# Patient Record
Sex: MALE | Race: WHITE | Employment: OTHER | URBAN - METROPOLITAN AREA
[De-identification: names, ages, dates, MRNs, and addresses within clinical notes are randomized per-mention and may not be internally consistent; named-entity substitution may affect disease eponyms.]

---

## 2017-01-27 ENCOUNTER — APPOINTMENT (OUTPATIENT)
Dept: GENERAL RADIOLOGY | Facility: CLINIC | Age: 32
End: 2017-01-27
Attending: EMERGENCY MEDICINE
Payer: COMMERCIAL

## 2017-01-27 ENCOUNTER — HOSPITAL ENCOUNTER (EMERGENCY)
Facility: CLINIC | Age: 32
Discharge: HOME | End: 2017-01-27
Attending: EMERGENCY MEDICINE | Admitting: EMERGENCY MEDICINE
Payer: COMMERCIAL

## 2017-01-27 VITALS
RESPIRATION RATE: 16 BRPM | HEART RATE: 55 BPM | DIASTOLIC BLOOD PRESSURE: 107 MMHG | HEIGHT: 69 IN | WEIGHT: 185 LBS | BODY MASS INDEX: 27.4 KG/M2 | OXYGEN SATURATION: 97 % | TEMPERATURE: 98.1 F | SYSTOLIC BLOOD PRESSURE: 154 MMHG

## 2017-01-27 DIAGNOSIS — K29.70 GASTRITIS WITHOUT BLEEDING, UNSPECIFIED CHRONICITY, UNSPECIFIED GASTRITIS TYPE: ICD-10-CM

## 2017-01-27 LAB
ALBUMIN SERPL-MCNC: 4.5 G/DL (ref 3.4–5)
ALBUMIN UR-MCNC: NEGATIVE MG/DL
ALP SERPL-CCNC: 73 U/L (ref 40–150)
ALT SERPL W P-5'-P-CCNC: 47 U/L (ref 0–70)
ANION GAP SERPL CALCULATED.3IONS-SCNC: 8 MMOL/L (ref 3–14)
APPEARANCE UR: CLEAR
AST SERPL W P-5'-P-CCNC: 22 U/L (ref 0–45)
BASOPHILS # BLD AUTO: 0 10E9/L (ref 0–0.2)
BASOPHILS NFR BLD AUTO: 0.5 %
BILIRUB DIRECT SERPL-MCNC: 0.1 MG/DL (ref 0–0.2)
BILIRUB SERPL-MCNC: 0.7 MG/DL (ref 0.2–1.3)
BILIRUB UR QL STRIP: NEGATIVE
BUN SERPL-MCNC: 12 MG/DL (ref 7–30)
CALCIUM SERPL-MCNC: 9.1 MG/DL (ref 8.5–10.1)
CHLORIDE SERPL-SCNC: 106 MMOL/L (ref 94–109)
CO2 SERPL-SCNC: 26 MMOL/L (ref 20–32)
COLOR UR AUTO: YELLOW
CREAT SERPL-MCNC: 0.95 MG/DL (ref 0.66–1.25)
D DIMER PPP FEU-MCNC: NORMAL UG/ML FEU (ref 0–0.5)
DIFFERENTIAL METHOD BLD: ABNORMAL
EOSINOPHIL # BLD AUTO: 0.1 10E9/L (ref 0–0.7)
EOSINOPHIL NFR BLD AUTO: 1.9 %
ERYTHROCYTE [DISTWIDTH] IN BLOOD BY AUTOMATED COUNT: 11.9 % (ref 10–15)
GFR SERPL CREATININE-BSD FRML MDRD: NORMAL ML/MIN/1.7M2
GLUCOSE SERPL-MCNC: 87 MG/DL (ref 70–99)
GLUCOSE UR STRIP-MCNC: NEGATIVE MG/DL
HCT VFR BLD AUTO: 42.8 % (ref 40–53)
HGB BLD-MCNC: 15.2 G/DL (ref 13.3–17.7)
HGB UR QL STRIP: NEGATIVE
IMM GRANULOCYTES # BLD: 0 10E9/L (ref 0–0.4)
IMM GRANULOCYTES NFR BLD: 0 %
INTERPRETATION ECG - MUSE: NORMAL
KETONES UR STRIP-MCNC: NEGATIVE MG/DL
LEUKOCYTE ESTERASE UR QL STRIP: NEGATIVE
LIPASE SERPL-CCNC: 120 U/L (ref 73–393)
LYMPHOCYTES # BLD AUTO: 2.2 10E9/L (ref 0.8–5.3)
LYMPHOCYTES NFR BLD AUTO: 37.6 %
MCH RBC QN AUTO: 34.6 PG (ref 26.5–33)
MCHC RBC AUTO-ENTMCNC: 35.5 G/DL (ref 31.5–36.5)
MCV RBC AUTO: 98 FL (ref 78–100)
MONOCYTES # BLD AUTO: 0.4 10E9/L (ref 0–1.3)
MONOCYTES NFR BLD AUTO: 7.2 %
NEUTROPHILS # BLD AUTO: 3.1 10E9/L (ref 1.6–8.3)
NEUTROPHILS NFR BLD AUTO: 52.8 %
NITRATE UR QL: NEGATIVE
NRBC # BLD AUTO: 0 10*3/UL
NRBC BLD AUTO-RTO: 0 /100
PH UR STRIP: 5.5 PH (ref 5–7)
PLATELET # BLD AUTO: 375 10E9/L (ref 150–450)
POTASSIUM SERPL-SCNC: 3.8 MMOL/L (ref 3.4–5.3)
PROT SERPL-MCNC: 7.9 G/DL (ref 6.8–8.8)
RBC # BLD AUTO: 4.39 10E12/L (ref 4.4–5.9)
RBC #/AREA URNS AUTO: 1 /HPF (ref 0–2)
SODIUM SERPL-SCNC: 140 MMOL/L (ref 133–144)
SP GR UR STRIP: 1.02 (ref 1–1.03)
URN SPEC COLLECT METH UR: NORMAL
UROBILINOGEN UR STRIP-MCNC: NORMAL MG/DL (ref 0–2)
WBC # BLD AUTO: 5.8 10E9/L (ref 4–11)
WBC #/AREA URNS AUTO: 1 /HPF (ref 0–2)

## 2017-01-27 PROCEDURE — 85025 COMPLETE CBC W/AUTO DIFF WBC: CPT | Performed by: EMERGENCY MEDICINE

## 2017-01-27 PROCEDURE — 85379 FIBRIN DEGRADATION QUANT: CPT | Performed by: EMERGENCY MEDICINE

## 2017-01-27 PROCEDURE — 25000125 ZZHC RX 250: Performed by: EMERGENCY MEDICINE

## 2017-01-27 PROCEDURE — 25000132 ZZH RX MED GY IP 250 OP 250 PS 637: Performed by: EMERGENCY MEDICINE

## 2017-01-27 PROCEDURE — 80048 BASIC METABOLIC PNL TOTAL CA: CPT | Performed by: EMERGENCY MEDICINE

## 2017-01-27 PROCEDURE — 71020 XR CHEST 2 VW: CPT

## 2017-01-27 PROCEDURE — 93005 ELECTROCARDIOGRAM TRACING: CPT

## 2017-01-27 PROCEDURE — 83690 ASSAY OF LIPASE: CPT | Performed by: EMERGENCY MEDICINE

## 2017-01-27 PROCEDURE — 80076 HEPATIC FUNCTION PANEL: CPT | Performed by: EMERGENCY MEDICINE

## 2017-01-27 PROCEDURE — 81001 URINALYSIS AUTO W/SCOPE: CPT | Performed by: EMERGENCY MEDICINE

## 2017-01-27 PROCEDURE — 99285 EMERGENCY DEPT VISIT HI MDM: CPT | Mod: 25

## 2017-01-27 RX ORDER — OMEPRAZOLE 40 MG/1
40 CAPSULE, DELAYED RELEASE ORAL DAILY
Qty: 30 CAPSULE | Refills: 1 | Status: SHIPPED | OUTPATIENT
Start: 2017-01-27

## 2017-01-27 RX ORDER — MINOCYCLINE HYDROCHLORIDE 50 MG/1
CAPSULE ORAL
COMMUNITY

## 2017-01-27 RX ORDER — ACYCLOVIR 400 MG/1
400 TABLET ORAL
COMMUNITY
Start: 2017-01-23 | End: 2017-01-30

## 2017-01-27 RX ORDER — DEXTROAMPHETAMINE SACCHARATE, AMPHETAMINE ASPARTATE, DEXTROAMPHETAMINE SULFATE AND AMPHETAMINE SULFATE 1.25; 1.25; 1.25; 1.25 MG/1; MG/1; MG/1; MG/1
5 TABLET ORAL
COMMUNITY

## 2017-01-27 RX ORDER — DEXTROAMPHETAMINE SACCHARATE, AMPHETAMINE ASPARTATE MONOHYDRATE, DEXTROAMPHETAMINE SULFATE AND AMPHETAMINE SULFATE 6.25; 6.25; 6.25; 6.25 MG/1; MG/1; MG/1; MG/1
25 CAPSULE, EXTENDED RELEASE ORAL
COMMUNITY

## 2017-01-27 RX ADMIN — LIDOCAINE HYDROCHLORIDE 30 ML: 20 SOLUTION ORAL; TOPICAL at 15:28

## 2017-01-27 ASSESSMENT — ENCOUNTER SYMPTOMS
ABDOMINAL DISTENTION: 1
CONSTIPATION: 0
ABDOMINAL PAIN: 1
SHORTNESS OF BREATH: 0

## 2017-01-27 NOTE — ED AVS SNAPSHOT
Emergency Department    [address] [place] [address]    Phone:  [phone]    Fax:  [phone]                                       Isiah Wong   MRN: [id number]    Department:   Emergency Department   Date of Visit:  1/27/2017           After Visit Summary Signature Page     I have received my discharge instructions, and my questions have been answered. I have discussed any challenges I see with this plan with the nurse or doctor.    ..........................................................................................................................................  Patient/Patient Representative Signature      ..........................................................................................................................................  Patient Representative Print Name and Relationship to Patient    ..................................................               ................................................  Date                                            Time    ..........................................................................................................................................  Reviewed by Signature/Title    ...................................................              ..............................................  Date                                                            Time

## 2017-01-27 NOTE — ED AVS SNAPSHOT
Emergency Department    [address] [facility] [address]    Phone:  [phone]    Fax:  [phone]                                       Isiah Wong   MRN: [id number]    Department:   Emergency Department   Date of Visit:  1/27/2017           Patient Information     Date Of Birth          1985        Your diagnoses for this visit were:     Gastritis without bleeding, unspecified chronicity, unspecified gastritis type        You were seen by John Williamson MD.      Follow-up Information     Follow up with Robert Larios MD.    Specialty:  Internal Medicine    Contact information:    [facility]  [address] [facility] [address]  [phone]          Discharge Instructions         Gastritis (Adult)    Gastritis is inflammation and irritation of the stomach lining. It can be present for a short time (acute) or be long lasting (chronic). Gastritis is often caused by infection with bacteria called H pylori. More than a third of people in the  have this bacteria in their bodies. In many cases, H pylori causes no problems or symptoms. In some people, though, the infection irritates the stomach lining and causes gastritis. Other causes of stomach irritation include drinking alcohol or taking pain-relieving medicines called NSAIDs (such as aspirin or ibuprofen).   Symptoms of gastritis can include:    Abdominal pain or bloating    Loss of appetite    Nausea or vomiting    Vomiting blood or having black stools    Feeling more tired than usual  An inflamed and irritated stomach lining is more likely to develop a sore called an ulcer. To help prevent this, gastritis should be treated.  Home care  If needed, medicines may be prescribed. If you have H pylori infection, treating it will likely relieve your symptoms. Other changes can help reduce stomach irritation and help it heal.    If you have been prescribed medicines for H pylori infection, take them as directed. Take all of  the medicine until it is finished or your healthcare provider tells you to stop, even if you feel better.    Your healthcare provider may recommend avoiding NSAIDs. If you take daily aspirin for your heart or other medical reasons, do not stop without talking to your healthcare provider first.    Avoid drinking alcohol.    Stop smoking. Smoking can irritate the stomach and delay healing. As much as possible, stay away from second hand smoke.  Follow-up care  Follow up with your healthcare provider, or as advised by our staff. Testing may be needed to check for inflammation or an ulcer.  When to seek medical advice  Call your healthcare provider for any of the following:    Stomach pain that gets worse or moves to the lower right abdomen (appendix area)    Chest pain that appears or gets worse, or spreads to the back, neck, shoulder, or arm    Frequent vomiting (can t keep down liquids)    Blood in the stool or vomit (red or black in color)    Feeling weak or dizzy    Fever of 100.4 F (38 C) or higher, or as directed by your healthcare provider    5251-0385 [employer]. [address] [place] [address]. All rights reserved. This information is not intended as a substitute for professional medical care. Always follow your healthcare professional's instructions.          24 Hour Appointment Hotline       To make an appointment at any [facility] clinic, call [phone] ([phone]). If you don't have a family doctor or clinic, we will help you find one. [facility] clinics are conveniently located to serve the needs of you and your family.             Review of your medicines      START taking        Dose / Directions Last dose taken    omeprazole 40 MG capsule   Commonly known as:  priLOSEC   Dose:  40 mg   Quantity:  30 capsule        Take 1 capsule (40 mg) by mouth daily Take 30-60 minutes before a meal.   Refills:  1          Our records show that you are taking the medicines listed below.  If these are incorrect, please call your family doctor or clinic.        Dose / Directions Last dose taken    acyclovir 400 MG tablet   Commonly known as:  ZOVIRAX   Dose:  400 mg        Take 400 mg by mouth   Refills:  0        * amphetamine-dextroamphetamine 25 MG 24 hr capsule   Commonly known as:  ADDERALL XR   Dose:  25 mg        Take 25 mg by mouth   Refills:  0        * amphetamine-dextroamphetamine 5 MG per tablet   Commonly known as:  ADDERALL   Dose:  5 mg        Take 5 mg by mouth   Refills:  0        FISH OIL        Refills:  0        minocycline 50 MG capsule   Commonly known as:  MINOCIN/DYNACIN        Refills:  0        psyllium 58.6 % Powd   Commonly known as:  METAMUCIL        Take by mouth daily   Refills:  0        * Notice:  This list has 2 medication(s) that are the same as other medications prescribed for you. Read the directions carefully, and ask your doctor or other care provider to review them with you.            Prescriptions were sent or printed at these locations (1 Prescription)                   Other Prescriptions                Printed at Department/Unit printer (1 of 1)         omeprazole (PRILOSEC) 40 MG capsule                Procedures and tests performed during your visit     Basic metabolic panel    CBC with platelets differential    D dimer quantitative    EKG 12 lead    Hepatic panel    Lipase    UA with Microscopic    XR Chest 2 Views      Orders Needing Specimen Collection     None      Pending Results     No orders found from 1/26/2017 to 1/28/2017.            Pending Culture Results     No orders found from 1/26/2017 to 1/28/2017.       Test Results from your hospital stay           1/27/2017  2:40 PM - Interface, [place] Results      Component Results     Component Value Ref Range & Units Status    WBC 5.8 4.0 - 11.0 10e9/L Final    RBC Count 4.39 (L) 4.4 - 5.9 10e12/L Final    Hemoglobin 15.2 13.3 - 17.7 g/dL Final    Hematocrit 42.8 40.0 - 53.0 % Final    MCV 98 78 -  100 fl Final    MCH 34.6 (H) 26.5 - 33.0 pg Final    MCHC 35.5 31.5 - 36.5 g/dL Final    RDW 11.9 10.0 - 15.0 % Final    Platelet Count 375 150 - 450 10e9/L Final    Diff Method Automated Method  Final    % Neutrophils 52.8 % Final    % Lymphocytes 37.6 % Final    % Monocytes 7.2 % Final    % Eosinophils 1.9 % Final    % Basophils 0.5 % Final    % Immature Granulocytes 0.0 % Final    Nucleated RBCs 0 0 /100 Final    Absolute Neutrophil 3.1 1.6 - 8.3 10e9/L Final    Absolute Lymphocytes 2.2 0.8 - 5.3 10e9/L Final    Absolute Monocytes 0.4 0.0 - 1.3 10e9/L Final    Absolute Eosinophils 0.1 0.0 - 0.7 10e9/L Final    Absolute Basophils 0.0 0.0 - 0.2 10e9/L Final    Abs Immature Granulocytes 0.0 0 - 0.4 10e9/L Final    Absolute Nucleated RBC 0.0  Final         1/27/2017  2:52 PM - Interface, Flexilab Results      Component Results     Component Value Ref Range & Units Status    Sodium 140 133 - 144 mmol/L Final    Potassium 3.8 3.4 - 5.3 mmol/L Final    Chloride 106 94 - 109 mmol/L Final    Carbon Dioxide 26 20 - 32 mmol/L Final    Anion Gap 8 3 - 14 mmol/L Final    Glucose 87 70 - 99 mg/dL Final    Urea Nitrogen 12 7 - 30 mg/dL Final    Creatinine 0.95 0.66 - 1.25 mg/dL Final    GFR Estimate >90  Non  GFR Calc   >60 mL/min/1.7m2 Final    GFR Estimate If Black >90   GFR Calc   >60 mL/min/1.7m2 Final    Calcium 9.1 8.5 - 10.1 mg/dL Final         1/27/2017  2:52 PM - Interface, Flexilab Results      Component Results     Component Value Ref Range & Units Status    Lipase 120 73 - 393 U/L Final         1/27/2017  3:24 PM - Interface, Flexilab Results      Component Results     Component Value Ref Range & Units Status    D Dimer  0.0 - 0.50 ug/ml FEU Final    <0.3  This D-dimer assay is intended for use in conjuntion with a clinical pretest   probability assessment model to exclude pulmonary embolism (PE) and as an aid   in the diagnosis of deep venous thrombosis (DVT) in outpatients suspected  of PE   or DVT. The cut-off value is 0.5 g/mL FEU.           1/27/2017  3:07 PM - Interface, Flexilab Results      Component Results     Component Value Ref Range & Units Status    Color Urine Yellow  Final    Appearance Urine Clear  Final    Glucose Urine Negative NEG mg/dL Final    Bilirubin Urine Negative NEG Final    Ketones Urine Negative NEG mg/dL Final    Specific Gravity Urine 1.020 1.003 - 1.035 Final    Blood Urine Negative NEG Final    pH Urine 5.5 5.0 - 7.0 pH Final    Protein Albumin Urine Negative NEG mg/dL Final    Urobilinogen mg/dL Normal 0.0 - 2.0 mg/dL Final    Nitrite Urine Negative NEG Final    Leukocyte Esterase Urine Negative NEG Final    Source Midstream Urine  Final    WBC Urine 1 0 - 2 /HPF Final    RBC Urine 1 0 - 2 /HPF Final         1/27/2017  3:13 PM - Interface, Flexilab Results      Component Results     Component Value Ref Range & Units Status    Bilirubin Direct 0.1 0.0 - 0.2 mg/dL Final    Bilirubin Total 0.7 0.2 - 1.3 mg/dL Final    Albumin 4.5 3.4 - 5.0 g/dL Final    Protein Total 7.9 6.8 - 8.8 g/dL Final    Alkaline Phosphatase 73 40 - 150 U/L Final    ALT 47 0 - 70 U/L Final    AST 22 0 - 45 U/L Final         1/27/2017  3:45 PM - Interface, Radiant Ib      Narrative     XR CHEST 2 VW 1/27/2017 3:43 PM     HISTORY: right sided chest pain/ruq pain        Impression     IMPRESSION: Negative exam.    SCOTT SOTOMAYOR MD                Clinical Quality Measure: Blood Pressure Screening     Your blood pressure was checked while you were in the emergency department today. The last reading we obtained was  BP: (!) 154/107 mmHg . Please read the guidelines below about what these numbers mean and what you should do about them.  If your systolic blood pressure (the top number) is less than 120 and your diastolic blood pressure (the bottom number) is less than 80, then your blood pressure is normal. There is nothing more that you need to do about it.  If your systolic blood pressure (the top  "number) is 120-139 or your diastolic blood pressure (the bottom number) is 80-89, your blood pressure may be higher than it should be. You should have your blood pressure rechecked within a year by a primary care provider.  If your systolic blood pressure (the top number) is 140 or greater or your diastolic blood pressure (the bottom number) is 90 or greater, you may have high blood pressure. High blood pressure is treatable, but if left untreated over time it can put you at risk for heart attack, stroke, or kidney failure. You should have your blood pressure rechecked by a primary care provider within the next 4 weeks.  If your provider in the emergency department today gave you specific instructions to follow-up with your doctor or provider even sooner than that, you should follow that instruction and not wait for up to 4 weeks for your follow-up visit.        Thank you for choosing [facility]       Thank you for choosing [facility] for your care. Our goal is always to provide you with excellent care. Hearing back from our patients is one way we can continue to improve our services. Please take a few minutes to complete the written survey that you may receive in the mail after you visit with us. Thank you!        [employer] Information     [employer] lets you send messages to your doctor, view your test results, renew your prescriptions, schedule appointments and more. To sign up, go to [email or web address] . Click on \"Log in\" on the left side of the screen, which will take you to the Welcome page. Then click on \"Sign up Now\" on the right side of the page.     You will be asked to enter the access code listed below, as well as some personal information. Please follow the directions to create your username and password.     Your access code is: [email or web address]  Expires: 2017  3:05 PM     Your access code will  in 90 days. If you need help or a new code, please call your [facility] clinic or [phone].        Care " EveryWhere ID     This is your Care EveryWhere ID. This could be used by other organizations to access your [facility] medical records  [id number]        After Visit Summary       This is your record. Keep this with you and show to your community pharmacist(s) and doctor(s) at your next visit.

## 2017-01-27 NOTE — DISCHARGE INSTRUCTIONS
Gastritis (Adult)    Gastritis is inflammation and irritation of the stomach lining. It can be present for a short time (acute) or be long lasting (chronic). Gastritis is often caused by infection with bacteria called H pylori. More than a third of people in the [place] have this bacteria in their bodies. In many cases, H pylori causes no problems or symptoms. In some people, though, the infection irritates the stomach lining and causes gastritis. Other causes of stomach irritation include drinking alcohol or taking pain-relieving medicines called NSAIDs (such as aspirin or ibuprofen).   Symptoms of gastritis can include:    Abdominal pain or bloating    Loss of appetite    Nausea or vomiting    Vomiting blood or having black stools    Feeling more tired than usual  An inflamed and irritated stomach lining is more likely to develop a sore called an ulcer. To help prevent this, gastritis should be treated.  Home care  If needed, medicines may be prescribed. If you have H pylori infection, treating it will likely relieve your symptoms. Other changes can help reduce stomach irritation and help it heal.    If you have been prescribed medicines for H pylori infection, take them as directed. Take all of the medicine until it is finished or your healthcare provider tells you to stop, even if you feel better.    Your healthcare provider may recommend avoiding NSAIDs. If you take daily aspirin for your heart or other medical reasons, do not stop without talking to your healthcare provider first.    Avoid drinking alcohol.    Stop smoking. Smoking can irritate the stomach and delay healing. As much as possible, stay away from second hand smoke.  Follow-up care  Follow up with your healthcare provider, or as advised by our staff. Testing may be needed to check for inflammation or an ulcer.  When to seek medical advice  Call your healthcare provider for any of the following:    Stomach pain that gets worse or moves to the lower  right abdomen (appendix area)    Chest pain that appears or gets worse, or spreads to the back, neck, shoulder, or arm    Frequent vomiting (can t keep down liquids)    Blood in the stool or vomit (red or black in color)    Feeling weak or dizzy    Fever of 100.4 F (38 C) or higher, or as directed by your healthcare provider    5053-0256 [employer]. [address] [place] [address]. All rights reserved. This information is not intended as a substitute for professional medical care. Always follow your healthcare professional's instructions.

## 2017-01-27 NOTE — ED PROVIDER NOTES
"  History     Chief Complaint:  Abdominal pain    HPI   Isiah Wong is a 31 year old male with a history of pancreatitis who presents with abdominal pain. Around 1000 this morning, the patient was working as a referee and began experiencing intermittent right upper quadrant pain that radiated into his back. Since that time, the pain has become more severe. The pain is exacerbated with deep breathing and twisting. He is also experiencing abdominal bloating. He has not taken any medications for the pain. The patient had a banana and orange juice this morning. He has had normal bowel movements. No trauma. No shortness of breath. No heavy lifting. No fatty or greasy foods or alcohol last night. He does not take NSAIDs on a regular basis. He has never had surgery on his abdomen. He states that these symptoms feel different than his episodes of pancreatitis and he has never had these symptoms in the past.    Cardiac Risk Factors   Sex: male   Tobacco: former smoker  Hypertension: Negative  Diabetes: Negative  Hyperlipidemia: Negative  Family History: Negative    Allergies:  No known drug allergies.     Medications:    Minocycline  Fish oil     Past Medical History:    Pancreatitis    Past Surgical History:    History reviewed. No pertinent past surgical history.    Family History:    History reviewed. No pertinent family history    Social History:  Marital Status: single  Presents to the ED with a friend  Tobacco Use: former smoker  Alcohol Use: negative  PCP: None     Review of Systems   Respiratory: Negative for shortness of breath.    Gastrointestinal: Positive for abdominal pain and abdominal distention. Negative for constipation.   All other systems reviewed and are negative.    Physical Exam   First Vitals:  BP: (!) 156/99 mmHg  Heart Rate: 72  Temp: 98.1  F (36.7  C)  Height: 175.3 cm (5' 9\")  Weight: 83.915 kg (185 lb)  SpO2: 100 %    Physical Exam  GENERAL: well developed, pleasant  HEAD: atraumatic  EYES: " pupils reactive, extraocular muscles intact, conjunctivae normal  ENT:  mucus membranes moist  NECK:  trachea midline, normal range of motion  RESPIRATORY: no tachypnea, breath sounds clear to auscultation   CVS: normal S1/S2, no murmurs, intact distal pulses  ABDOMEN: soft, mild right upper quadrant pain, nondistention  MUSCULOSKELETAL: no deformities  SKIN: warm and dry, no acute rashes or ulceration  NEURO: GCS 15, cranial nerves intact, alert and oriented x3  PSYCH:  Mood/affect normal    Emergency Department Course   ECG:  @ 1504  Indication: abdominal pain  Vent. Rate 67 bpm. [place] interval 142 ms. QRS duration 90 ms. QT/QTc 372/393 ms. P-R-T axis -7 55 23.   Normal sinus rhythm. Normal ECG.    Read @ 1514 by Dr. Williamson.    Imaging:  Radiographic findings were communicated with the patient who voiced understanding of the findings.    XR Chest 2 Views  Negative exam.   Report per radiology.    Laboratory:  CBC:  WBC 5.8, HGB 15.2,   BMP: WNL (Creatinine 0.95)  Lipase: 120  Hepatic panel: Bili Direct 0.1, Bili Total 0.7, Albumin 4.5, Protein Total 7.9, Alkphos 73, ALT 47, AST 22.  D dimer: <0.3    UA: Clear yellow urine WNL     Interventions:  (1528) Xylocaine 2% 15mL, Mylanta 15mL PO    Emergency Department Course:  Nursing notes and vitals reviewed.  I performed an exam of the patient as documented above.  EKG was done, interpretation as above.  Blood was drawn from the patient. This was sent for laboratory testing, findings above.   Urine sample was obtained and sent for laboratory analysis, findings above.  The patient was sent for a chest x-ray while in the emergency department, findings above.   Findings and plan explained to the patient. Patient discharged home with instructions regarding supportive care, medications, and reasons to return. The importance of close follow-up was reviewed. The patient was prescribed omeprazole.    Impression & Plan    Medical Decision Making:  Patient presents with right  upper quadrant pain. Denies it being in correlation with food and does not give a history of any fatty or greasy food ingestion. I see he has had ultrasound in the past that were normal. I see that he was [facility] through [facility] within the last week and he notes that this feels similar. Describes right sided upper quadrant to central pain going into his back. EKG is unrevealing. D dimer, chest x-ray, LFTs, are all normal. Patient notes that he has been under increased stress with some issues with his father, relationship challenges, and friend challenges, so has been under more stress. Patient was given a GI cocktail with significant improvement in his symptoms. Did not suspect pericarditis, acute coronary syndrome, pulmonary embolism, biliary colic, pancreatitis. Patient is not having any lightheadedness symptoms or post concussive symptoms today, but notes that he has had those intermittently and was referred to the head injury clinic, which I strongly encouraged him to do and also encouraged him to establish primary care. Just noted prior to discharge that his blood pressure was slightly elevated and encouraged him to check his blood pressure when he is not in the hospital and also to establish primary care.    Diagnosis:    ICD-10-CM    1. Gastritis without bleeding, unspecified chronicity, unspecified gastritis type K29.70      Disposition:  Discharge to home.    Discharge Medications:  New Prescriptions    OMEPRAZOLE (PRILOSEC) 40 MG CAPSULE    Take 1 capsule (40 mg) by mouth daily Take 30-60 minutes before a meal.     I, Pritesh Sol, am serving as a scribe on 1/27/2017 at 2:24 PM to personally document services performed by Dr. Williamson based on my observations and the provider's statements to me.       John Williamson MD  01/30/17 0148

## 2021-05-29 ENCOUNTER — RECORDS - HEALTHEAST (OUTPATIENT)
Dept: ADMINISTRATIVE | Facility: CLINIC | Age: 36
End: 2021-05-29

## 2021-09-11 ENCOUNTER — OFFICE VISIT (OUTPATIENT)
Dept: URGENT CARE | Facility: URGENT CARE | Age: 36
End: 2021-09-11
Payer: COMMERCIAL

## 2021-09-11 VITALS
SYSTOLIC BLOOD PRESSURE: 147 MMHG | BODY MASS INDEX: 28.06 KG/M2 | WEIGHT: 190 LBS | DIASTOLIC BLOOD PRESSURE: 101 MMHG | TEMPERATURE: 97.2 F | OXYGEN SATURATION: 99 % | HEART RATE: 74 BPM

## 2021-09-11 DIAGNOSIS — R36.9 PENILE DISCHARGE: Primary | ICD-10-CM

## 2021-09-11 DIAGNOSIS — R30.0 DYSURIA: ICD-10-CM

## 2021-09-11 LAB
ALBUMIN UR-MCNC: NEGATIVE MG/DL
APPEARANCE UR: CLEAR
BACTERIA #/AREA URNS HPF: ABNORMAL /HPF
BILIRUB UR QL STRIP: NEGATIVE
COLOR UR AUTO: YELLOW
GLUCOSE UR STRIP-MCNC: NEGATIVE MG/DL
HGB UR QL STRIP: NEGATIVE
KETONES UR STRIP-MCNC: NEGATIVE MG/DL
LEUKOCYTE ESTERASE UR QL STRIP: ABNORMAL
NITRATE UR QL: NEGATIVE
PH UR STRIP: 8 [PH] (ref 5–7)
RBC #/AREA URNS AUTO: ABNORMAL /HPF
SP GR UR STRIP: 1.02 (ref 1–1.03)
UROBILINOGEN UR STRIP-ACNC: 0.2 E.U./DL
WBC #/AREA URNS AUTO: ABNORMAL /HPF

## 2021-09-11 PROCEDURE — 99203 OFFICE O/P NEW LOW 30 MIN: CPT | Mod: 25 | Performed by: PREVENTIVE MEDICINE

## 2021-09-11 PROCEDURE — 87591 N.GONORRHOEAE DNA AMP PROB: CPT | Performed by: PREVENTIVE MEDICINE

## 2021-09-11 PROCEDURE — 81001 URINALYSIS AUTO W/SCOPE: CPT | Performed by: PREVENTIVE MEDICINE

## 2021-09-11 PROCEDURE — 96372 THER/PROPH/DIAG INJ SC/IM: CPT | Performed by: PREVENTIVE MEDICINE

## 2021-09-11 PROCEDURE — 87491 CHLMYD TRACH DNA AMP PROBE: CPT | Performed by: PREVENTIVE MEDICINE

## 2021-09-11 RX ORDER — DOXYCYCLINE 100 MG/1
100 TABLET ORAL 2 TIMES DAILY
Qty: 14 TABLET | Refills: 0 | Status: SHIPPED | OUTPATIENT
Start: 2021-09-11 | End: 2021-09-18

## 2021-09-11 NOTE — PROGRESS NOTES
Assessment & Plan     1. Dysuria  - Chlamydia & Gonorrhea by PCR, GICH/Range - Clinic Collect  - UA Macro with Reflex to Micro and Culture - lab collect  - Urine Microscopic  - Urine Culture Aerobic Bacterial; Future    2. Penile discharge  - doxycycline monohydrate (ADOXA) 100 MG tablet; Take 1 tablet (100 mg) by mouth 2 times daily for 7 days  Dispense: 14 tablet; Refill: 0    Treating presumptively for gonorrhea and chlamydia with ceftriaxone and doxycycline  Doxycycline 100 mg two times per day for 7 days  Follow up in 7 days if not improving.  No sex until symptoms resolve.  Advise sexual partners get evaluated in the clinic.  Consider other std testing - hiv, hep c, hep b, syphillis - with pcp in follow up.  Gonorrhea and chlamydia tests will be available in 1-2 days.       31 minutes spent on the date of the encounter doing chart review, review of test results, interpretation of tests, patient visit and documentation         No follow-ups on file.    Reji Olivares MD  [facility]RG[facility]    Subjective     Isiah Wong is a 36 year old year old male who presents to clinic today for the following health issues:    Patient presents with:  Urgent Care  UTI: c/o dysuria for 1 day    This is a 35 yo man who presents with dysuria and penile discharge - yellow -  for one day.  Has a new sexual partner and she does not have any symptoms.  Wears condoms.  No rash noted.  No hx of std.      Patient Active Problem List   Diagnosis     Light headedness     Pain in male perineum     Anxiety       Current Outpatient Medications   Medication     doxycycline monohydrate (ADOXA) 100 MG tablet     acyclovir (ZOVIRAX) 400 MG tablet     amphetamine-dextroamphetamine (ADDERALL XR) 25 MG 24 hr capsule     amphetamine-dextroamphetamine (ADDERALL) 5 MG per tablet     FISH OIL     minocycline (MINOCIN/DYNACIN) 50 MG capsule     omeprazole (PRILOSEC) 40 MG capsule     psyllium (METAMUCIL) 58.6 % POWD      No current facility-administered medications for this visit.       Past Medical History:   Diagnosis Date     Light headedness 8/19/2014     NO ACTIVE PROBLEMS      Pain in male perineum 8/19/2014     Pancreatitis     Had one episode of pancreatitis 2 years ago.         Social History   reports that he has quit smoking. He has never used smokeless tobacco. He reports current alcohol use. He reports that he does not use drugs.    No family history on file.    Review of Systems  Constitutional, HEENT, cardiovascular, pulmonary, GI, , musculoskeletal, neuro, skin, endocrine and psych systems are negative, except as otherwise noted.      Objective    BP (!) 147/101   Pulse 74   Temp 97.2  F (36.2  C) (Tympanic)   Wt 86.2 kg (190 lb)   SpO2 99%   BMI 28.06 kg/m    Physical Exam   GENERAL: healthy, alert and no distress  EYES: Eyes grossly normal to inspection, PERRL and conjunctivae and sclerae normal  HENT: ear canals and TM's normal, nose and mouth without ulcers or lesions  NECK: no adenopathy, no asymmetry, masses, or scars and thyroid normal to palpation  RESP: lungs clear to auscultation - no rales, rhonchi or wheezes  CV: regular rate and rhythm, normal S1 S2, no S3 or S4, no murmur, click or rub, no peripheral edema and peripheral pulses strong  ABDOMEN: soft, nontender, no hepatosplenomegaly, no masses and bowel sounds normal  MS: no gross musculoskeletal defects noted, no edema  SKIN: no suspicious lesions or rashes  NEURO: Normal strength and tone, mentation intact and speech normal  PSYCH: mentation appears normal, affect normal/bright   - no rash, no lad, no testicular ttp, no varicocele or hydrocele, no epididymal ttp, yellow penile discharge    Results for orders placed or performed in visit on 09/11/21 (from the past 24 hour(s))   UA Macro with Reflex to Micro and Culture - lab collect    Specimen: Urine, Clean Catch   Result Value Ref Range    Color Urine Yellow Colorless, Straw, Light  Yellow, Yellow    Appearance Urine Clear Clear    Glucose Urine Negative Negative mg/dL    Bilirubin Urine Negative Negative    Ketones Urine Negative Negative mg/dL    Specific Gravity Urine 1.020 1.003 - 1.035    Blood Urine Negative Negative    pH Urine 8.0 (H) 5.0 - 7.0    Protein Albumin Urine Negative Negative mg/dL    Urobilinogen Urine 0.2 0.2, 1.0 E.U./dL    Nitrite Urine Negative Negative    Leukocyte Esterase Urine Trace (A) Negative   Urine Microscopic   Result Value Ref Range    Bacteria Urine Few (A) None Seen /HPF    RBC Urine 0-2 0-2 /HPF /HPF    WBC Urine 0-5 0-5 /HPF /HPF    Narrative    Urine Culture not indicated

## 2021-09-11 NOTE — PATIENT INSTRUCTIONS
Treating presumptively for gonorrhea and chlamydia with ceftriaxone and doxycycline  Doxycycline 100 mg two times per day for 7 days    Follow up in 7 days if not improving.    No sex until symptoms resolve.    Advise sexual partners get evaluated in the clinic.    Consider other std testing - hiv, hep c, hep b, syphillis - with pcp in follow up.  Gonorrhea and chlamydia tests will be available in 1-2 days.

## 2021-09-13 LAB
C TRACH DNA SPEC QL PROBE+SIG AMP: NEGATIVE
N GONORRHOEA DNA SPEC QL NAA+PROBE: POSITIVE

## 2021-11-09 ENCOUNTER — HOSPITAL ENCOUNTER (EMERGENCY)
Facility: HOSPITAL | Age: 36
Discharge: HOME | End: 2021-11-09
Attending: EMERGENCY MEDICINE | Admitting: EMERGENCY MEDICINE
Payer: COMMERCIAL

## 2021-11-09 VITALS
TEMPERATURE: 98.2 F | BODY MASS INDEX: 28.06 KG/M2 | DIASTOLIC BLOOD PRESSURE: 94 MMHG | SYSTOLIC BLOOD PRESSURE: 139 MMHG | RESPIRATION RATE: 18 BRPM | OXYGEN SATURATION: 97 % | WEIGHT: 190 LBS | HEART RATE: 77 BPM

## 2021-11-09 DIAGNOSIS — S01.512A INTRAORAL LACERATION, INITIAL ENCOUNTER: ICD-10-CM

## 2021-11-09 PROCEDURE — 90471 IMMUNIZATION ADMIN: CPT | Performed by: EMERGENCY MEDICINE

## 2021-11-09 PROCEDURE — 250N000011 HC RX IP 250 OP 636: Performed by: EMERGENCY MEDICINE

## 2021-11-09 PROCEDURE — 90715 TDAP VACCINE 7 YRS/> IM: CPT | Performed by: EMERGENCY MEDICINE

## 2021-11-09 PROCEDURE — 99283 EMERGENCY DEPT VISIT LOW MDM: CPT | Mod: 25

## 2021-11-09 RX ADMIN — CLOSTRIDIUM TETANI TOXOID ANTIGEN (FORMALDEHYDE INACTIVATED), CORYNEBACTERIUM DIPHTHERIAE TOXOID ANTIGEN (FORMALDEHYDE INACTIVATED), BORDETELLA PERTUSSIS TOXOID ANTIGEN (GLUTARALDEHYDE INACTIVATED), BORDETELLA PERTUSSIS FILAMENTOUS HEMAGGLUTININ ANTIGEN (FORMALDEHYDE INACTIVATED), BORDETELLA PERTUSSIS PERTACTIN ANTIGEN, AND BORDETELLA PERTUSSIS FIMBRIAE 2/3 ANTIGEN 0.5 ML: 5; 2; 2.5; 5; 3; 5 INJECTION, SUSPENSION INTRAMUSCULAR at 02:47

## 2021-11-09 NOTE — ED PROVIDER NOTES
NAME: Isiah Wong  AGE: 36 year old male  YOB: 1985  MRN: [id number]  EVALUATION DATE & TIME: 2021  1:34 AM    PCP: No Ref-Primary, Physician    ED PROVIDER: Chriss Grant M.D.    Chief Complaint   Patient presents with     Laceration     FINAL IMPRESSION:  1. Intraoral laceration, initial encounter      MEDICAL DECISION MAKIN:30 AM I met with the patient, obtained history, performed an initial exam, and discussed options and plan for diagnostics and treatment here in the ED.   2:43 AM We discussed the plan for discharge and the patient is agreeable. Reviewed supportive cares, symptomatic treatment, outpatient follow up, and reasons to return to the Emergency Department. Patient to be discharged by ED RN.    Pertinent Labs & Imaging studies reviewed. (See chart for details)     Patient was clinically assessed and consented to treatment. After assessment, medical decision making and workup were discussed with the patient. The patient was agreeable to plan for testing, workup, and treatment.    Isiah Wong is a 36 year old male who presents with lip laceration.   Differential diagnosis includes but not limited to through and through laceration, intraoral laceration, laceration of the vermilion border, dental fracture.  Patient with injury to the lower lip and concerned that it might be through and through.  There is a small laceration on the inside of the lip and no findings of through and through injury.  Externally there is a small abrasion just medial to where the intraoral lack is.  No active bleeding and either would require suture repair.  Patient is unsure last tetanus on check was in .  Patient was recommended for healing on its own as neither deep or requiring sutures and update tetanus.  Patient given wound care instructions and will be plan for discharge home.    The importance of close follow up was discussed. We reviewed warning signs and symptoms, and I  instructed Mr. Wong to return to the emergency department immediately if he develops any new or worsening symptoms. I provided additional verbal discharge instructions. Mr. Wong expressed understanding and agreement with this plan of care, his questions were answered, and he was discharged in stable condition.     0 minutes of critical care time    MEDICATIONS GIVEN IN THE EMERGENCY:  Medications   Tdap (tetanus-diphtheria-acell pertussis) (ADACEL) injection 0.5 mL (0.5 mLs Intramuscular Given 11/9/21 0247)       NEW PRESCRIPTIONS STARTED AT TODAY'S ER VISIT:  Discharge Medication List as of 11/9/2021  2:49 AM             =================================================================    HPI    Patient information was obtained from: Patient    Use of : N/A       Isiah Wong is a 36 year old male who presents with a laceration.    The patient reports getting hit in the mouth with a high stick while playing hockey tonight.  He did have some bleeding from the outside of his lip during the game.  He noticed a cut to the inside of his lip.  He was unsure if he needed stiches so he came in.  No syncope.  His last Tdap was in 2006.    REVIEW OF SYSTEMS   Review of Systems   Skin:        Positive for laceration to inner lip   Neurological: Negative for syncope.   All other systems reviewed and are negative.     PAST MEDICAL HISTORY:  Past Medical History:   Diagnosis Date     Light headedness 8/19/2014     NO ACTIVE PROBLEMS      Pain in male perineum 8/19/2014     Pancreatitis     Had one episode of pancreatitis 2 years ago.         PAST SURGICAL HISTORY:  Past Surgical History:   Procedure Laterality Date     TONSILLECTOMY, ADENOIDECTOMY ADULT, COMBINED         CURRENT MEDICATIONS:    No current facility-administered medications for this encounter.    Current Outpatient Medications:      acyclovir (ZOVIRAX) 400 MG tablet, Take 400 mg by mouth, Disp: , Rfl:      amphetamine-dextroamphetamine (ADDERALL  XR) 25 MG 24 hr capsule, Take 25 mg by mouth (Patient not taking: Reported on 9/11/2021), Disp: , Rfl:      amphetamine-dextroamphetamine (ADDERALL) 5 MG per tablet, Take 5 mg by mouth (Patient not taking: Reported on 9/11/2021), Disp: , Rfl:      FISH OIL, , Disp: , Rfl:      minocycline (MINOCIN/DYNACIN) 50 MG capsule, , Disp: , Rfl:      omeprazole (PRILOSEC) 40 MG capsule, Take 1 capsule (40 mg) by mouth daily Take 30-60 minutes before a meal. (Patient not taking: Reported on 9/11/2021), Disp: 30 capsule, Rfl: 1     psyllium (METAMUCIL) 58.6 % POWD, Take by mouth daily (Patient not taking: Reported on 9/11/2021), Disp: , Rfl:     ALLERGIES:  No Known Allergies    FAMILY HISTORY:  No family history on file.    SOCIAL HISTORY:   Social History     Socioeconomic History     Marital status: Single     Spouse name: Not on file     Number of children: Not on file     Years of education: Not on file     Highest education level: Not on file   Occupational History     Not on file   Tobacco Use     Smoking status: Former Smoker     Smokeless tobacco: Never Used   Substance and Sexual Activity     Alcohol use: Yes     Comment: social drinking     Drug use: No     Comment: marijuana     Sexual activity: Not on file   Other Topics Concern     Not on file   Social History Narrative     Not on file     Social Determinants of Health     Financial Resource Strain: Not on file   Food Insecurity: Not on file   Transportation Needs: Not on file   Physical Activity: Not on file   Stress: Not on file   Social Connections: Not on file   Intimate Partner Violence: Not on file   Housing Stability: Not on file       PHYSICAL EXAM:    Vitals: BP (!) 139/94   Pulse 77   Temp 98.2  F (36.8  C) (Temporal)   Resp 18   Wt 86.2 kg (190 lb)   SpO2 97%   BMI 28.06 kg/m     Physical Exam  Vitals and nursing note reviewed.   Constitutional:       General: He is not in acute distress.     Appearance: Normal appearance. He is normal weight. He  is not ill-appearing or toxic-appearing.   HENT:      Head: Normocephalic.      Nose: Nose normal.      Mouth/Throat:      Mouth: Mucous membranes are moist.      Pharynx: Oropharynx is clear.      Comments: No loose dentition, no missing dentition, no gingival injury.  Small 0.25 cm laceration with bruising with no active bleeding to the inside of the lower lip on the left, not crossing the vermilion border.  Eyes:      Extraocular Movements: Extraocular movements intact.      Conjunctiva/sclera: Conjunctivae normal.      Pupils: Pupils are equal, round, and reactive to light.   Pulmonary:      Effort: Pulmonary effort is normal.   Musculoskeletal:         General: No signs of injury. Normal range of motion.      Cervical back: Normal range of motion. No tenderness.   Skin:     General: Skin is warm and dry.      Coloration: Skin is not pale.      Findings: No erythema.   Neurological:      General: No focal deficit present.      Mental Status: He is alert.   Psychiatric:         Mood and Affect: Mood normal.           LAB:  All pertinent labs reviewed and interpreted.  Labs Ordered and Resulted from Time of ED Arrival to Time of ED Departure - No data to display    RADIOLOGY:  No orders to display     PROCEDURES:   Procedures       I, Sheldon Pope, am serving as a scribe to document services personally performed by Dr. Chriss Grant  based on my observation and the provider's statements to me. I, Chriss Grant MD attest that Sheldon Pope is acting in a scribe capacity, has observed my performance of the services and has documented them in accordance with my direction.      Chriss Grant M.D.  Emergency Medicine  [facility] EMERGENCY DEPARTMENT  [facility] [facility] [id number] [phone]  Dept: [phone]      Chriss Grant MD  11/09/21 0300